# Patient Record
Sex: MALE | Employment: FULL TIME | URBAN - NONMETROPOLITAN AREA
[De-identification: names, ages, dates, MRNs, and addresses within clinical notes are randomized per-mention and may not be internally consistent; named-entity substitution may affect disease eponyms.]

---

## 2023-12-27 PROBLEM — I48.0 PAROXYSMAL ATRIAL FIBRILLATION (MULTI): Status: ACTIVE | Noted: 2023-12-27

## 2023-12-27 PROBLEM — C85.90 NON HODGKIN'S LYMPHOMA (MULTI): Status: ACTIVE | Noted: 2023-12-27

## 2023-12-27 PROBLEM — Z95.810 PRESENCE OF SINGLE IMPLANTABLE CARDIOVERTER-DEFIBRILLATOR (ICD): Status: ACTIVE | Noted: 2023-12-27

## 2023-12-27 PROBLEM — I47.20 VENTRICULAR TACHYCARDIA (MULTI): Status: ACTIVE | Noted: 2023-12-27

## 2023-12-27 PROBLEM — I46.9: Status: ACTIVE | Noted: 2023-12-27

## 2023-12-27 RX ORDER — CALCIUM CARBONATE 300MG(750)
400 TABLET,CHEWABLE ORAL DAILY PRN
COMMUNITY

## 2023-12-27 RX ORDER — ASPIRIN 81 MG/1
1 TABLET ORAL DAILY
COMMUNITY

## 2023-12-27 RX ORDER — HYDROCODONE BITARTRATE AND ACETAMINOPHEN 5; 325 MG/1; MG/1
1 TABLET ORAL EVERY 6 HOURS PRN
COMMUNITY
Start: 2021-11-17

## 2024-01-23 ENCOUNTER — OFFICE VISIT (OUTPATIENT)
Dept: CARDIOLOGY | Facility: CLINIC | Age: 49
End: 2024-01-23
Payer: COMMERCIAL

## 2024-01-23 VITALS
DIASTOLIC BLOOD PRESSURE: 72 MMHG | WEIGHT: 210 LBS | BODY MASS INDEX: 27.83 KG/M2 | SYSTOLIC BLOOD PRESSURE: 120 MMHG | HEIGHT: 73 IN | HEART RATE: 69 BPM

## 2024-01-23 DIAGNOSIS — C85.90 NON-HODGKIN'S LYMPHOMA, UNSPECIFIED BODY REGION, UNSPECIFIED NON-HODGKIN LYMPHOMA TYPE (MULTI): ICD-10-CM

## 2024-01-23 DIAGNOSIS — Z95.810 PRESENCE OF SINGLE IMPLANTABLE CARDIOVERTER-DEFIBRILLATOR (ICD): ICD-10-CM

## 2024-01-23 DIAGNOSIS — I47.20 VENTRICULAR TACHYCARDIA (MULTI): ICD-10-CM

## 2024-01-23 DIAGNOSIS — I48.0 PAROXYSMAL ATRIAL FIBRILLATION (MULTI): ICD-10-CM

## 2024-01-23 DIAGNOSIS — I46.9: Primary | ICD-10-CM

## 2024-01-23 PROCEDURE — 93000 ELECTROCARDIOGRAM COMPLETE: CPT | Performed by: INTERNAL MEDICINE

## 2024-01-23 PROCEDURE — 99214 OFFICE O/P EST MOD 30 MIN: CPT | Performed by: INTERNAL MEDICINE

## 2024-01-23 PROCEDURE — 1036F TOBACCO NON-USER: CPT | Performed by: INTERNAL MEDICINE

## 2024-01-23 ASSESSMENT — ENCOUNTER SYMPTOMS
SHORTNESS OF BREATH: 1
DIZZINESS: 1
PALPITATIONS: 1

## 2024-01-23 NOTE — PATIENT INSTRUCTIONS
Please bring all medicines, vitamins, and herbal supplements with you when you come to the office.    Prescriptions will not be filled unless you are compliant with your follow up appointments or have a follow up appointment scheduled as per instruction of your physician. Refills should be requested at the time of your visit.      Follow up one year

## 2024-01-23 NOTE — LETTER
January 23, 2024     Judith Neal,   [address]  [facility] [address]    Patient: Chauncey Bush   YOB: 1975   Date of Visit: 1/23/2024       Dear Dr. Judith Neal, :    Thank you for referring Chauncey Bush to me for evaluation. Below are my notes for this consultation.  If you have questions, please do not hesitate to call me. I look forward to following your patient along with you.       Sincerely,     Theo Mccann MD      CC: No Recipients  ______________________________________________________________________________________

## 2024-01-23 NOTE — PROGRESS NOTES
Subjective   Chauncey Bush is a 48 y.o. male       Chief Complaint    Annual Exam          HPI          Patient is here for follow-up continue management for previous presentation with sudden cardiac death.  He underwent extensive EP evaluation with implantation of an AICD.  He refused to follow-up with the EP department.  He refused to take Coreg.  Over the last year he has been doing well.  He is stable.  He denies any cardiac complaint.  His recent device check showed a few brief episodes of VT.    ASSESSMENT:      1. Previous history of sudden cardiac death due to ventricular tachycardia. He had underwent extensive workup. There was some concern of Brugada syndrome of prolonged QTc interval, which did not turn out to be the case. He underwent generator replacement several years ago.  His recent device check noted and reviewed with him  2. Remote history of paroxysmal atrial fibrillation with a CHADS2 vascular score of 0, no recurrence on aspirin therapy.  3. Minimally overweight.  4. Status post automatic implantable cardioverter-defibrillator implant, single lead with appropriate device function.   5. History of non-Hodgkin lymphoma apparently had recurrence followed by oncology  6. Noncompliance with follow-up and taking medication he has refused to take Coreg     RECOMMENDATIONS  :   1.  The patient elected to remain on magnesium only and declined beta-blocker  2. The patient was advised to notify me change in cardiac status symptoms.  3. Continue to encourage him to compliance with his medication follow-up  4. Patient was constantly regarding losing weight, exercise and dietary modification  5. Reviewed with him his recent lab work  6. We will see him back in the office in 1 year follow-up     Review of Systems   Cardiovascular:  Positive for palpitations.   Respiratory:  Positive for shortness of breath.    Neurological:  Positive for dizziness.   All other systems reviewed and are negative.         Visit  "Vitals  /72 (BP Location: Right arm, Patient Position: Sitting)   Pulse 69   Ht 1.854 m (6' 1\")   Wt 95.3 kg (210 lb)   BMI 27.71 kg/m²   Smoking Status Former   BSA 2.22 m²    EKG done in office today     Objective   Physical Exam  Constitutional:       Appearance: Normal appearance. He is normal weight.   HENT:      Nose: Nose normal.   Neck:      Vascular: No carotid bruit.   Cardiovascular:      Rate and Rhythm: Normal rate.      Pulses: Normal pulses.      Heart sounds: Normal heart sounds.   Pulmonary:      Effort: Pulmonary effort is normal.   Abdominal:      General: Bowel sounds are normal.      Palpations: Abdomen is soft.   Genitourinary:     Rectum: Normal.   Musculoskeletal:         General: Normal range of motion.      Cervical back: Normal range of motion.      Right lower leg: No edema.      Left lower leg: No edema.   Skin:     General: Skin is warm and dry.   Neurological:      General: No focal deficit present.      Mental Status: He is alert.   Psychiatric:         Mood and Affect: Mood normal.         Behavior: Behavior normal.         Thought Content: Thought content normal.         Judgment: Judgment normal.         Current Medications    Current Outpatient Medications:     aspirin 81 mg EC tablet, Take 1 tablet (81 mg) by mouth once daily., Disp: , Rfl:     HYDROcodone-acetaminophen (Norco) 5-325 mg tablet, Take 1 tablet by mouth every 6 hours if needed for moderate pain (4 - 6)., Disp: , Rfl:     magnesium oxide (Mag-Ox) 400 mg tablet, Take 1 tablet (400 mg) by mouth once daily as needed., Disp: , Rfl:                      Assessment/Plan   1. Sudden cardiac death (CMS/HCC)  Follow Up In Cardiology      2. Presence of single implantable cardioverter-defibrillator (ICD)  Follow Up In Cardiology      3. Paroxysmal atrial fibrillation (CMS/HCC)  Follow Up In Cardiology    ECG 12 Lead      4. Non-Hodgkin's lymphoma, unspecified body region, unspecified non-Hodgkin lymphoma type (CMS/HCC)   "      5. Ventricular tachycardia (CMS/HCC)  Follow Up In Cardiology         Scribe Attestation  By signing my name below, I, petra Scrkatlin   attest that this documentation has been prepared under the direction and in the presence of Theo Mccann MD.

## 2024-01-25 DIAGNOSIS — Z95.810 PRESENCE OF SINGLE IMPLANTABLE CARDIOVERTER-DEFIBRILLATOR (ICD): ICD-10-CM

## 2024-01-25 DIAGNOSIS — I47.20 VENTRICULAR TACHYCARDIA (MULTI): ICD-10-CM

## 2024-01-25 RX ORDER — CARVEDILOL 3.12 MG/1
3.12 TABLET ORAL
Qty: 60 TABLET | Refills: 11 | Status: SHIPPED | OUTPATIENT
Start: 2024-01-25 | End: 2025-01-24

## 2024-01-25 NOTE — TELEPHONE ENCOUNTER
Phoned patient  no answer left vm to callback     Message  Received: Today  Theo Mccann MD  P  [id number] Clinical Support Staff  Device check suggest that patient had recent VT.  Advised patient I highly recommend that he start Coreg 3.25 twice daily and consider follow-up with our EP department Dr. Solis.  Historically the patient had been noncompliant and declined to take medication.  Advised if he should go to the hospital if he had any recurrence

## 2024-03-19 ENCOUNTER — TELEPHONE (OUTPATIENT)
Dept: CARDIOLOGY | Facility: CLINIC | Age: 49
End: 2024-03-19
Payer: COMMERCIAL

## 2024-03-19 NOTE — TELEPHONE ENCOUNTER
Isabell at dr. Cervantes office phoned. Patient to have right axillary lymphnode biopsy. Scheduled 03/25/2024. Need POC. Need ok to hold ASA 5 days prior too.       Fax-[phone]

## 2024-07-15 PROCEDURE — 93282 PRGRMG EVAL IMPLANTABLE DFB: CPT | Performed by: INTERNAL MEDICINE

## 2024-11-07 ENCOUNTER — APPOINTMENT (OUTPATIENT)
Dept: CARDIOLOGY | Facility: CLINIC | Age: 49
End: 2024-11-07
Payer: COMMERCIAL

## 2024-11-07 VITALS
DIASTOLIC BLOOD PRESSURE: 88 MMHG | HEART RATE: 70 BPM | BODY MASS INDEX: 28.76 KG/M2 | WEIGHT: 217 LBS | HEIGHT: 73 IN | SYSTOLIC BLOOD PRESSURE: 132 MMHG

## 2024-11-07 DIAGNOSIS — I48.0 PAROXYSMAL ATRIAL FIBRILLATION (MULTI): ICD-10-CM

## 2024-11-07 DIAGNOSIS — Z95.810 PRESENCE OF SINGLE IMPLANTABLE CARDIOVERTER-DEFIBRILLATOR (ICD): ICD-10-CM

## 2024-11-07 DIAGNOSIS — I46.9 SUDDEN CARDIAC DEATH: Primary | ICD-10-CM

## 2024-11-07 DIAGNOSIS — I47.20 VENTRICULAR TACHYCARDIA (MULTI): ICD-10-CM

## 2024-11-07 DIAGNOSIS — C85.90 NON-HODGKIN'S LYMPHOMA, UNSPECIFIED BODY REGION, UNSPECIFIED NON-HODGKIN LYMPHOMA TYPE: ICD-10-CM

## 2024-11-07 DIAGNOSIS — R06.02 SHORTNESS OF BREATH: ICD-10-CM

## 2024-11-07 PROCEDURE — 93000 ELECTROCARDIOGRAM COMPLETE: CPT | Performed by: INTERNAL MEDICINE

## 2024-11-07 PROCEDURE — 99214 OFFICE O/P EST MOD 30 MIN: CPT | Performed by: INTERNAL MEDICINE

## 2024-11-07 PROCEDURE — 3008F BODY MASS INDEX DOCD: CPT | Performed by: INTERNAL MEDICINE

## 2024-11-07 PROCEDURE — 1036F TOBACCO NON-USER: CPT | Performed by: INTERNAL MEDICINE

## 2024-11-07 ASSESSMENT — ENCOUNTER SYMPTOMS
DYSPNEA ON EXERTION: 1
SHORTNESS OF BREATH: 1

## 2024-11-07 NOTE — PROGRESS NOTES
Subjective   Chauncey Bush is a 48 y.o. male       Chief Complaint    Follow-up          HPI       Patient is here for follow-up requesting clearance to proceed for chemotherapy for his lymphoma.  He does not have history of sudden cardiac death.  He underwent extensive EP evaluation.  There was some concern about Brugada syndrome or prolonged QTc interval which did not turn out to be the case.  The patient had been historically noncompliant.  He refused to take any medication except magnesium and it seems like recently he started to take his Coreg again.  His last device check failed to demonstrate any arrhythmia.  Reviewing the record indicate he has been diagnosed with marginal zone lymphoma which apparently progressing and is scheduled to undergo chemotherapy.  Several options have been discussed with the patient and reviewed online dictated by Dr. Shanika Crain.  He is apparently scheduled to receive CHOP protocol.  The patient indicated to me that an echocardiogram has been ordered and is scheduled.  Patient is complaining of symptoms of fatigue, tiredness.    ASSESSMENT:      1. Previous history of sudden cardiac death due to ventricular tachycardia. He had underwent extensive workup. There was some concern of Brugada syndrome of prolonged QTc interval, which did not turn out to be the case. He underwent generator replacement several years ago.  His recent device check noted and reviewed with him.  No recent device treatment.  2. Remote history of paroxysmal atrial fibrillation with a CHADS2 vascular score of 0, no recurrence on aspirin therapy.  3.  Mildly overweight with BMI of 28  4. Status post automatic implantable cardioverter-defibrillator implant, single lead with appropriate device function.  Recent device check show no therapy   5.  History of marginal zone lymphoma with plan to proceed with CHOP protocol  6. Noncompliance with follow-up and taking medication recently had been taking his magnesium  "and Coreg  7.  Historically the patient had no coronary artery disease and normal LV systolic function.  8.  Patient has been describing recently some fatigue, tiredness and shortness of breath unclear whether it is related to his lymphoma or changes in cardiac status.  An echocardiogram has been ordered     RECOMMENDATIONS    1.  The patient will continue present cardiac medication  2. The patient was advised to notify me change in cardiac status symptoms.  3.  The possible cardiac side effect for chemotherapy discussed with him.  Patient understand and anxious to proceed with his chemotherapy  4.  The patient indicated to me that an echocardiogram has been scheduled  5.  I advised the patient to notify me change his cardiac status or symptoms  6. We will see him back in the office in 1 year follow-up or earlier if the need arise and I told the patient to notify if he need any help scheduling his echo  Review of Systems   Constitutional: Positive for malaise/fatigue.   Cardiovascular:  Positive for dyspnea on exertion.   Respiratory:  Positive for shortness of breath.    All other systems reviewed and are negative.           Vitals:    11/07/24 1541   BP: 132/88   BP Location: Right arm   Patient Position: Sitting   Pulse: 70   Weight: 98.4 kg (217 lb)   Height: 1.854 m (6' 1\")        EKG done in office today    Objective   Physical Exam  Constitutional:       Appearance: Normal appearance.   HENT:      Nose: Nose normal.   Neck:      Vascular: No carotid bruit.   Cardiovascular:      Rate and Rhythm: Normal rate.      Pulses: Normal pulses.      Heart sounds: Normal heart sounds.   Pulmonary:      Effort: Pulmonary effort is normal.   Abdominal:      General: Bowel sounds are normal.      Palpations: Abdomen is soft.   Musculoskeletal:         General: Normal range of motion.      Cervical back: Normal range of motion.      Right lower leg: No edema.      Left lower leg: No edema.   Skin:     General: Skin is warm " and dry.   Neurological:      General: No focal deficit present.      Mental Status: He is alert.   Psychiatric:         Mood and Affect: Mood normal.         Behavior: Behavior normal.         Thought Content: Thought content normal.         Judgment: Judgment normal.         Allergies  Penicillins     Current Medications    Current Outpatient Medications:     aspirin 81 mg EC tablet, Take 1 tablet (81 mg) by mouth once daily., Disp: , Rfl:     carvedilol (Coreg) 3.125 mg tablet, Take 1 tablet (3.125 mg) by mouth 2 times a day with meals., Disp: 60 tablet, Rfl: 11    HYDROcodone-acetaminophen (Norco) 5-325 mg tablet, Take 1 tablet by mouth every 6 hours if needed for moderate pain (4 - 6)., Disp: , Rfl:     magnesium oxide (Mag-Ox) 400 mg tablet, Take 1 tablet (400 mg) by mouth once daily as needed., Disp: , Rfl:                      Assessment/Plan   1. Sudden cardiac death        2. Ventricular tachycardia (Multi)  ECG 12 Lead      3. Presence of single implantable cardioverter-defibrillator (ICD)        4. Paroxysmal atrial fibrillation (Multi)  Follow Up In Cardiology      5. Non-Hodgkin's lymphoma, unspecified body region, unspecified non-Hodgkin lymphoma type        6. BMI 27.0-27.9,adult        7. Shortness of breath                 Scribe Attestation  By signing my name below, I, Conor Rodas LPNibrory   attest that this documentation has been prepared under the direction and in the presence of Theo Mccann MD.     Provider Attestation - Scribe documentation    All medical record entries made by the Scribe were at my direction and personally dictated by me. I have reviewed the chart and agree that the record accurately reflects my personal performance of the history, physical exam, discussion and plan.

## 2024-11-07 NOTE — LETTER
November 7, 2024     Judith Neal DO  [address]  [place] [address]    Patient: Chauncey Bush   YOB: 1975   Date of Visit: 11/7/2024       Dear Dr. Judith Neal DO:    Thank you for referring Chauncey Bush to me for evaluation. Below are my notes for this consultation.  If you have questions, please do not hesitate to call me. I look forward to following your patient along with you.       Sincerely,     Theo Mccann MD      CC: No Recipients  ______________________________________________________________________________________    Subjective   Chauncey Bush is a 48 y.o. male       Chief Complaint    Follow-up          HPI       Patient is here for follow-up requesting clearance to proceed for chemotherapy for his lymphoma.  He does not have history of sudden cardiac death.  He underwent extensive EP evaluation.  There was some concern about Brugada syndrome or prolonged QTc interval which did not turn out to be the case.  The patient had been historically noncompliant.  He refused to take any medication except magnesium and it seems like recently he started to take his Coreg again.  His last device check failed to demonstrate any arrhythmia.  Reviewing the record indicate he has been diagnosed with marginal zone lymphoma which apparently progressing and is scheduled to undergo chemotherapy.  Several options have been discussed with the patient and reviewed online dictated by Dr. Shanika Crain.  He is apparently scheduled to receive CHOP protocol.  The patient indicated to me that an echocardiogram has been ordered and is scheduled.  Patient is complaining of symptoms of fatigue, tiredness.    ASSESSMENT:      1. Previous history of sudden cardiac death due to ventricular tachycardia. He had underwent extensive workup. There was some concern of Brugada syndrome of prolonged QTc interval, which did not turn out to be the case. He underwent generator  "replacement several years ago.  His recent device check noted and reviewed with him.  No recent device treatment.  2. Remote history of paroxysmal atrial fibrillation with a CHADS2 vascular score of 0, no recurrence on aspirin therapy.  3.  Mildly overweight with BMI of 28  4. Status post automatic implantable cardioverter-defibrillator implant, single lead with appropriate device function.  Recent device check show no therapy   5.  History of marginal zone lymphoma with plan to proceed with CHOP protocol  6. Noncompliance with follow-up and taking medication recently had been taking his magnesium and Coreg  7.  Historically the patient had no coronary artery disease and normal LV systolic function.  8.  Patient has been describing recently some fatigue, tiredness and shortness of breath unclear whether it is related to his lymphoma or changes in cardiac status.  An echocardiogram has been ordered     RECOMMENDATIONS    1.  The patient will continue present cardiac medication  2. The patient was advised to notify me change in cardiac status symptoms.  3.  The possible cardiac side effect for chemotherapy discussed with him.  Patient understand and anxious to proceed with his chemotherapy  4.  The patient indicated to me that an echocardiogram has been scheduled  5.  I advised the patient to notify me change his cardiac status or symptoms  6. We will see him back in the office in 1 year follow-up or earlier if the need arise and I told the patient to notify if he need any help scheduling his echo  Review of Systems   Constitutional: Positive for malaise/fatigue.   Cardiovascular:  Positive for dyspnea on exertion.   Respiratory:  Positive for shortness of breath.    All other systems reviewed and are negative.           Vitals:    11/07/24 1541   BP: 132/88   BP Location: Right arm   Patient Position: Sitting   Pulse: 70   Weight: 98.4 kg (217 lb)   Height: 1.854 m (6' 1\")        EKG done in office today    Objective "   Physical Exam  Constitutional:       Appearance: Normal appearance.   HENT:      Nose: Nose normal.   Neck:      Vascular: No carotid bruit.   Cardiovascular:      Rate and Rhythm: Normal rate.      Pulses: Normal pulses.      Heart sounds: Normal heart sounds.   Pulmonary:      Effort: Pulmonary effort is normal.   Abdominal:      General: Bowel sounds are normal.      Palpations: Abdomen is soft.   Musculoskeletal:         General: Normal range of motion.      Cervical back: Normal range of motion.      Right lower leg: No edema.      Left lower leg: No edema.   Skin:     General: Skin is warm and dry.   Neurological:      General: No focal deficit present.      Mental Status: He is alert.   Psychiatric:         Mood and Affect: Mood normal.         Behavior: Behavior normal.         Thought Content: Thought content normal.         Judgment: Judgment normal.         Allergies  Penicillins     Current Medications    Current Outpatient Medications:   •  aspirin 81 mg EC tablet, Take 1 tablet (81 mg) by mouth once daily., Disp: , Rfl:   •  carvedilol (Coreg) 3.125 mg tablet, Take 1 tablet (3.125 mg) by mouth 2 times a day with meals., Disp: 60 tablet, Rfl: 11  •  HYDROcodone-acetaminophen (Norco) 5-325 mg tablet, Take 1 tablet by mouth every 6 hours if needed for moderate pain (4 - 6)., Disp: , Rfl:   •  magnesium oxide (Mag-Ox) 400 mg tablet, Take 1 tablet (400 mg) by mouth once daily as needed., Disp: , Rfl:                      Assessment/Plan   1. Sudden cardiac death        2. Ventricular tachycardia (Multi)  ECG 12 Lead      3. Presence of single implantable cardioverter-defibrillator (ICD)        4. Paroxysmal atrial fibrillation (Multi)  Follow Up In Cardiology      5. Non-Hodgkin's lymphoma, unspecified body region, unspecified non-Hodgkin lymphoma type        6. BMI 27.0-27.9,adult        7. Shortness of breath                 Scribe Attestation  By signing my name below, Yamilex RICO LPN  , Scribrory    attest that this documentation has been prepared under the direction and in the presence of Theo Mccann MD.     Provider Attestation - Scribe documentation    All medical record entries made by the Scribe were at my direction and personally dictated by me. I have reviewed the chart and agree that the record accurately reflects my personal performance of the history, physical exam, discussion and plan.

## 2024-12-26 ENCOUNTER — TELEPHONE (OUTPATIENT)
Dept: CARDIOLOGY | Facility: CLINIC | Age: 49
End: 2024-12-26
Payer: COMMERCIAL

## 2024-12-26 DIAGNOSIS — I48.0 PAROXYSMAL ATRIAL FIBRILLATION (MULTI): ICD-10-CM

## 2024-12-26 DIAGNOSIS — I49.9 VENTRICULAR ARRHYTHMIA: ICD-10-CM

## 2024-12-26 DIAGNOSIS — R06.02 SHORTNESS OF BREATH: ICD-10-CM

## 2024-12-26 NOTE — TELEPHONE ENCOUNTER
----- Message from Theo Mccann sent at 12/26/2024 10:19 AM EST -----  Advised advised patient that device  check demonstrate  ventricular arrhythmia.  Check if patient having any symptoms.  Advised the patient that I would recommend to repeat his echo arrange for Lexiscan myocardial perfusion study and would recommend that he see  Dr. Graves for follow-up  ----- Message -----  From: Faiza Atkinson  Sent: 12/23/2024   3:46 PM EST  To: Theo Mccann MD

## 2024-12-26 NOTE — TELEPHONE ENCOUNTER
Spoke with pt and informed him of recommendations. He verbalized understanding.    Orders prepped for echo, richelle, and Ep referral and sent to Dr. Theo Mccann MD for signature    Task sent to  to call and arrange once order signed.

## 2025-01-06 ENCOUNTER — TELEPHONE (OUTPATIENT)
Dept: CARDIOLOGY | Facility: CLINIC | Age: 50
End: 2025-01-06
Payer: COMMERCIAL

## 2025-01-06 NOTE — TELEPHONE ENCOUNTER
Called patient and left a voicemail informing the patient we were calling regarding the referral to EP and to set up and appointment. Requested he call back to make an appointment.

## 2025-01-15 ENCOUNTER — HOSPITAL ENCOUNTER (OUTPATIENT)
Dept: RADIOLOGY | Facility: CLINIC | Age: 50
Discharge: HOME | End: 2025-01-15
Payer: COMMERCIAL

## 2025-01-15 ENCOUNTER — HOSPITAL ENCOUNTER (OUTPATIENT)
Dept: CARDIOLOGY | Facility: CLINIC | Age: 50
Discharge: HOME | End: 2025-01-15
Payer: COMMERCIAL

## 2025-01-15 VITALS — DIASTOLIC BLOOD PRESSURE: 86 MMHG | HEART RATE: 86 BPM | SYSTOLIC BLOOD PRESSURE: 132 MMHG

## 2025-01-15 DIAGNOSIS — I48.0 PAROXYSMAL ATRIAL FIBRILLATION (MULTI): ICD-10-CM

## 2025-01-15 DIAGNOSIS — R06.02 SHORTNESS OF BREATH: ICD-10-CM

## 2025-01-15 DIAGNOSIS — I49.9 VENTRICULAR ARRHYTHMIA: ICD-10-CM

## 2025-01-15 PROCEDURE — 3430000001 HC RX 343 DIAGNOSTIC RADIOPHARMACEUTICALS: Performed by: INTERNAL MEDICINE

## 2025-01-15 PROCEDURE — 78452 HT MUSCLE IMAGE SPECT MULT: CPT

## 2025-01-15 PROCEDURE — 2500000004 HC RX 250 GENERAL PHARMACY W/ HCPCS (ALT 636 FOR OP/ED): Performed by: INTERNAL MEDICINE

## 2025-01-15 PROCEDURE — 93017 CV STRESS TEST TRACING ONLY: CPT

## 2025-01-15 PROCEDURE — A9502 TC99M TETROFOSMIN: HCPCS | Performed by: INTERNAL MEDICINE

## 2025-01-15 PROCEDURE — 93018 CV STRESS TEST I&R ONLY: CPT | Performed by: INTERNAL MEDICINE

## 2025-01-15 PROCEDURE — 78452 HT MUSCLE IMAGE SPECT MULT: CPT | Performed by: INTERNAL MEDICINE

## 2025-01-15 PROCEDURE — 93016 CV STRESS TEST SUPVJ ONLY: CPT | Performed by: INTERNAL MEDICINE

## 2025-01-15 RX ORDER — REGADENOSON 0.08 MG/ML
0.4 INJECTION, SOLUTION INTRAVENOUS ONCE
Status: COMPLETED | OUTPATIENT
Start: 2025-01-15 | End: 2025-01-15

## 2025-01-15 RX ORDER — AMINOPHYLLINE 25 MG/ML
50 INJECTION, SOLUTION INTRAVENOUS ONCE
Status: COMPLETED | OUTPATIENT
Start: 2025-01-15 | End: 2025-01-15

## 2025-01-15 RX ADMIN — TETROFOSMIN 33.5 MILLICURIE: 0.23 INJECTION, POWDER, LYOPHILIZED, FOR SOLUTION INTRAVENOUS at 13:54

## 2025-01-15 RX ADMIN — REGADENOSON 0.4 MG: 0.08 INJECTION, SOLUTION INTRAVENOUS at 13:53

## 2025-01-15 RX ADMIN — TETROFOSMIN 10.8 MILLICURIE: 0.23 INJECTION, POWDER, LYOPHILIZED, FOR SOLUTION INTRAVENOUS at 12:51

## 2025-01-15 RX ADMIN — AMINOPHYLLINE 50 MG: 25 INJECTION, SOLUTION INTRAVENOUS at 13:55

## 2025-01-16 ENCOUNTER — TELEPHONE (OUTPATIENT)
Dept: CARDIOLOGY | Facility: CLINIC | Age: 50
End: 2025-01-16
Payer: COMMERCIAL

## 2025-01-16 NOTE — TELEPHONE ENCOUNTER
----- Message from Theo Mccann sent at 1/16/2025  8:10 AM EST -----  No new orders. Please call patient with results.

## 2025-01-20 ENCOUNTER — APPOINTMENT (OUTPATIENT)
Dept: CARDIOLOGY | Facility: CLINIC | Age: 50
End: 2025-01-20
Payer: COMMERCIAL

## 2025-01-22 ENCOUNTER — APPOINTMENT (OUTPATIENT)
Dept: CARDIOLOGY | Facility: CLINIC | Age: 50
End: 2025-01-22
Payer: COMMERCIAL

## 2025-01-23 ENCOUNTER — APPOINTMENT (OUTPATIENT)
Dept: CARDIOLOGY | Facility: CLINIC | Age: 50
End: 2025-01-23
Payer: COMMERCIAL

## 2025-02-25 ENCOUNTER — APPOINTMENT (OUTPATIENT)
Dept: CARDIOLOGY | Facility: CLINIC | Age: 50
End: 2025-02-25
Payer: COMMERCIAL

## 2025-03-13 ENCOUNTER — TELEPHONE (OUTPATIENT)
Dept: CARDIOLOGY | Facility: CLINIC | Age: 50
End: 2025-03-13
Payer: COMMERCIAL

## 2025-03-13 NOTE — TELEPHONE ENCOUNTER
Called patient in regards to echo needing to be rescheduled (No show in February). Patient stated he just had an echo done at [facility] 11/19/24 and did not want to repeat this.

## 2025-03-25 ENCOUNTER — TELEPHONE (OUTPATIENT)
Dept: CARDIOLOGY | Facility: CLINIC | Age: 50
End: 2025-03-25
Payer: COMMERCIAL

## 2025-03-25 NOTE — TELEPHONE ENCOUNTER
Called pt to schedule with Dr. Graves. Pt refused, stating that he would like to talk to Dr. Grey first and see what kind of testing he wants as well as patient is struggling financially.

## 2025-11-18 ENCOUNTER — APPOINTMENT (OUTPATIENT)
Dept: CARDIOLOGY | Facility: CLINIC | Age: 50
End: 2025-11-18
Payer: COMMERCIAL